# Patient Record
Sex: FEMALE | Race: WHITE | Employment: OTHER | URBAN - METROPOLITAN AREA
[De-identification: names, ages, dates, MRNs, and addresses within clinical notes are randomized per-mention and may not be internally consistent; named-entity substitution may affect disease eponyms.]

---

## 2019-12-17 ENCOUNTER — HOSPITAL ENCOUNTER (OUTPATIENT)
Dept: CT IMAGING | Age: 74
Discharge: HOME | End: 2019-12-17
Attending: EMERGENCY MEDICINE
Payer: MEDICARE

## 2019-12-17 DIAGNOSIS — R10.32 LEFT LOWER QUADRANT PAIN: ICD-10-CM

## 2019-12-17 PROCEDURE — 74176 CT ABD & PELVIS W/O CONTRAST: CPT

## 2021-09-05 ENCOUNTER — HOSPITAL ENCOUNTER (EMERGENCY)
Age: 76
Discharge: HOME | End: 2021-09-05
Attending: EMERGENCY MEDICINE
Payer: MEDICARE

## 2021-09-05 ENCOUNTER — APPOINTMENT (OUTPATIENT)
Dept: CT IMAGING | Age: 76
End: 2021-09-05
Attending: PHYSICIAN ASSISTANT
Payer: MEDICARE

## 2021-09-05 ENCOUNTER — APPOINTMENT (OUTPATIENT)
Dept: GENERAL RADIOLOGY | Age: 76
End: 2021-09-05
Attending: EMERGENCY MEDICINE
Payer: MEDICARE

## 2021-09-05 VITALS
DIASTOLIC BLOOD PRESSURE: 85 MMHG | WEIGHT: 85 LBS | HEART RATE: 102 BPM | SYSTOLIC BLOOD PRESSURE: 134 MMHG | TEMPERATURE: 98.7 F | OXYGEN SATURATION: 97 % | BODY MASS INDEX: 17.84 KG/M2 | RESPIRATION RATE: 13 BRPM | HEIGHT: 58 IN

## 2021-09-05 DIAGNOSIS — M25.531 RIGHT WRIST PAIN: ICD-10-CM

## 2021-09-05 DIAGNOSIS — S50.311A ABRASION OF RIGHT ELBOW, INITIAL ENCOUNTER: Primary | ICD-10-CM

## 2021-09-05 DIAGNOSIS — S00.03XA CONTUSION OF SCALP, INITIAL ENCOUNTER: ICD-10-CM

## 2021-09-05 DIAGNOSIS — S09.90XA MINOR HEAD INJURY, INITIAL ENCOUNTER: ICD-10-CM
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
## 2021-09-05 PROCEDURE — 99282 EMERGENCY DEPT VISIT SF MDM: CPT

## 2021-09-05 PROCEDURE — 72125 CT NECK SPINE W/O DYE: CPT

## 2021-09-05 PROCEDURE — 70450 CT HEAD/BRAIN W/O DYE: CPT

## 2021-09-05 PROCEDURE — 73080 X-RAY EXAM OF ELBOW: CPT

## 2021-09-05 PROCEDURE — 73110 X-RAY EXAM OF WRIST: CPT

## 2021-09-05 RX ORDER — ACETAMINOPHEN 325 MG/1
650 TABLET ORAL
Qty: 20 TABLET | Refills: 0 | Status: SHIPPED | OUTPATIENT
Start: 2021-09-05

## 2021-09-05 NOTE — ED PROVIDER NOTES
EMERGENCY DEPARTMENT HISTORY AND PHYSICAL EXAM      Date: 9/5/2021  Patient Name: Kurt Andrews    History of Presenting Illness     Chief Complaint   Patient presents with    Fall     Into triage via wheelchair with c/o mechanical fall today at [employer] - hit the back of her head but denies LOC. Also c/o Rt elbow pain and Rt hand/wrist pain.  Elbow Pain       History Provided By: Patient and Patient's     HPI: Kurt Andrews, 64 y.o. female with a history of neuropathy, COPD, hypertension and others presents with her  to the ED with cc of an hour or so of 10 out of 10 constant, achy right elbow and right wrist pain that is worse with movement. She tells me she was at [employer] today when she was going to the restroom. She tells me she reached out for the handle but her fingers must of slipped and she fell backwards. There was no loss of consciousness. She was able to pick herself up with some help. She was able to walk away unassisted and without the use of a walker. She and her  tell me they are visiting from [place] and they have been here a couple of weeks and expect to go back home in about a week. Her  goes on to tell me that she did have a mechanical fall back on August 23 however she seems to be recovering well from that. There are no other complaints, changes, or physical findings at this time. PCP: Marija Javed MD    Current Outpatient Medications   Medication Sig Dispense Refill    acetaminophen (TYLENOL) 325 mg tablet Take 2 Tablets by mouth every four (4) hours as needed for Pain. 20 Tablet 0    amLODIPine (NORVASC) 5 mg tablet Take 5 mg by mouth daily.  gabapentin (NEURONTIN) 100 mg capsule Take  by mouth three (3) times daily.  atenolol (TENORMIN) 25 mg tablet Take 25 mg by mouth daily.  budesonide-formoterol (SYMBICORT) 80-4.5 mcg/actuation HFAA inhaler Take 2 Puffs by inhalation two (2) times a day.       albuterol (PROVENTIL HFA) 90 mcg/actuation inhaler Take 2 Puffs by inhalation every six (6) hours as needed for Wheezing. Past History     Past Medical History:  No past medical history on file. Past Surgical History:  No past surgical history on file. Family History:  No family history on file. Social History:  Social History     Tobacco Use    Smoking status: Not on file   Substance Use Topics    Alcohol use: Not on file    Drug use: Not on file       Allergies: Allergies   Allergen Reactions    Bactrim [Sulfamethoprim Ds] Hives    Compazine [Prochlorperazine Edisylate] Angioedema    Demerol [Meperidine] Anxiety    Erythromycin Rash    Fentanyl Palpitations    Levaquin [Levofloxacin] Other (comments)    Percocet [Oxycodone-Acetaminophen] Diarrhea    Sulfa (Sulfonamide Antibiotics) Other (comments)     Review of Systems   Review of Systems   Constitutional: Negative for fatigue and fever. HENT: Negative for ear pain and sore throat. Eyes: Negative for pain, redness and visual disturbance. Respiratory: Negative for cough and shortness of breath. Cardiovascular: Negative for chest pain and palpitations. Gastrointestinal: Negative for abdominal pain, nausea and vomiting. Genitourinary: Negative for dysuria, frequency and urgency. Musculoskeletal: Negative for back pain, gait problem, neck pain and neck stiffness. Right wrist and elbow pain   Skin: Negative for rash and wound. Neurological: Positive for headaches (Right-sided scalp tenderness). Negative for dizziness, weakness, light-headedness and numbness. Physical Exam   Physical Exam  Vitals and nursing note reviewed. Constitutional:       General: She is not in acute distress. Appearance: She is well-developed. She is not toxic-appearing. Comments:   Thin; frail-appearing; pleasant; no distress   HENT:      Head: Normocephalic and atraumatic. Jaw: No trismus. Comments:    There is a small, tender contusion of the right side of the scalp. There is no break in the skin. There is no bony crepitus. Right Ear: External ear normal.      Left Ear: External ear normal.      Ears:      Comments:   Canals are unobstructed and there is no effusion or hemotympanum     Nose: Nose normal.      Mouth/Throat:      Pharynx: Uvula midline. Eyes:      General: No scleral icterus. Conjunctiva/sclera: Conjunctivae normal.      Pupils: Pupils are equal, round, and reactive to light. Neck:      Comments:   No bony midline tenderness  Full active range of motion of all extremities  Cardiovascular:      Rate and Rhythm: Normal rate and regular rhythm. Pulmonary:      Effort: Pulmonary effort is normal. No tachypnea, accessory muscle usage or respiratory distress. Breath sounds: No decreased breath sounds or wheezing. Abdominal:      Palpations: Abdomen is soft. Tenderness: There is no abdominal tenderness. Musculoskeletal:         General: Normal range of motion. Right elbow: Tenderness present. Cervical back: Full passive range of motion without pain and normal range of motion. Comments:   RIGHT ELBOW:  No bruising, redness or swelling  There is a small abrasion of the posterior elbow  Full active range of motion in all planes  Tenderness about the abrasion    RIGHT WRIST:  There is a small contusion of the volar aspect of the wrist approaching the thenar eminence  No swelling or redness  No deformity  Full active range of motion in all planes  Volar tenderness   Skin:     Findings: No rash. Neurological:      Mental Status: She is alert and oriented to person, place, and time. She is not disoriented. GCS: GCS eye subscore is 4. GCS verbal subscore is 5. GCS motor subscore is 6. Cranial Nerves: No cranial nerve deficit.    Psychiatric:         Speech: Speech normal.       Diagnostic Study Results     Labs -   No results found for this or any previous visit (from the past 12 hour(s)). Radiologic Studies -   CT HEAD WO CONT   Final Result   No acute intracranial process seen      CT SPINE CERV WO CONT   Final Result      No fracture seen. Marked levoconvex curvature      XR ELBOW RT MIN 3 V   Final Result      Small right right elbow effusion   Insertional right triceps tendinitis versus triceps contusion      XR WRIST RT AP/LAT/OBL MIN 3V   Final Result   No acute fracture. Ulnar lunar triquetral hamate impaction syndrome        CT Results  (Last 48 hours)               09/05/21 1212  CT HEAD WO CONT Final result    Impression:  No acute intracranial process seen       Narrative:  EXAM: CT HEAD WO CONT       INDICATION: fall; head injury       COMPARISON: None. CONTRAST: None. TECHNIQUE: Unenhanced CT of the head was performed using 5 mm images. Brain and   bone windows were generated. Coronal and sagittal reformats. CT dose reduction   was achieved through use of a standardized protocol tailored for this   examination and automatic exposure control for dose modulation. FINDINGS:   The ventricles and sulci are normal in size, shape and configuration. There is   mild periventricular white matter hypodensity. . There is no intracranial   hemorrhage, extra-axial collection, or mass effect. The basilar cisterns are   open. No CT evidence of acute infarct. There is calcification and ectasia of the   aortic iliac system. The bone windows demonstrate no abnormalities. The visualized portions of the   paranasal sinuses and mastoid air cells are clear. 09/05/21 1212  CT SPINE CERV WO CONT Final result    Impression:      No fracture seen. Marked levoconvex curvature       Narrative:  EXAM:  CT CERVICAL SPINE WITHOUT CONTRAST       INDICATION: fall; pain. COMPARISON: None. CONTRAST:  None. TECHNIQUE: Multislice helical CT of the cervical spine was performed without   intravenous contrast administration.   Sagittal and coronal reformats were   generated. CT dose reduction was achieved through use of a standardized   protocol tailored for this examination and automatic exposure control for dose   modulation. FINDINGS:       The alignment is remarkable for a levoconvex curvature. There is a 2 mm   retrolisthesis of C3 relative to C4. There is no fracture or compression   deformity. The odontoid process is intact. The craniocervical junction is within   normal limits. The incidentally imaged soft tissues are within normal limits. C2-C3: There is no spinal canal or neural foraminal stenosis. C3-C4: There is a disc space narrowing. The central canal measures 12 mm   anterior to posterior. Mild right-sided neural foraminal stenosis is present   (series 305, image 37). C4-C5: There is a disc osteophyte complex. Mild right-sided neural foraminal   stenosis is present (series 305, image 42). Tre Castellanos C5-C6: There is a disc osteophyte complex. Mild to moderate right-sided neural   foraminal stenosis is present (axial image 45). Tre Castellanos C6-C7: There is no spinal canal or neural foraminal stenosis. C7-T1: There is no spinal canal or neural foraminal stenosis. CXR Results  (Last 48 hours)    None        Medical Decision Making   I am the first provider for this patient. I reviewed the vital signs, available nursing notes, past medical history, past surgical history, family history and social history. Vital Signs-Reviewed the patient's vital signs. Patient Vitals for the past 12 hrs:   Temp Pulse Resp BP SpO2   09/05/21 1106 98.7 °F (37.1 °C) (!) 102 13 134/85 97 %       Pulse Oximetry Analysis - 97% on RA    Records Reviewed: Nursing Notes, Old Medical Records, Previous Radiology Studies and Previous Laboratory Studies    Provider Notes (Medical Decision Making):   DDx: Fracture, contusion, ICH, cervical fracture, stumble and fall    Afebrile and well-appearing.   Plain films of the elbow and wrist are negative for fracture. On exam, she has full active range of motion all planes of her elbow and wrist.  There is a small abrasion of the posterior right elbow that I cleaned and apply a bandage. CT of the head and neck are negative for acute process. Additional testing deferred. Will send some Tylenol to the pharmacy for pain. Return precautions for any concerns. ED Course:   Initial assessment performed. The patients presenting problems have been discussed, and they are in agreement with the care plan formulated and outlined with them. I have encouraged them to ask questions as they arise throughout their visit. Disposition:  Discharge    PLAN:  1. Discharge Medication List as of 9/5/2021 12:45 PM      START taking these medications    Details   acetaminophen (TYLENOL) 325 mg tablet Take 2 Tablets by mouth every four (4) hours as needed for Pain., Normal, Disp-20 Tablet, R-0         CONTINUE these medications which have NOT CHANGED    Details   amLODIPine (NORVASC) 5 mg tablet Take 5 mg by mouth daily. , Historical Med      gabapentin (NEURONTIN) 100 mg capsule Take  by mouth three (3) times daily. , Historical Med      atenolol (TENORMIN) 25 mg tablet Take 25 mg by mouth daily. , Historical Med      budesonide-formoterol (SYMBICORT) 80-4.5 mcg/actuation HFAA inhaler Take 2 Puffs by inhalation two (2) times a day., Historical Med      albuterol (PROVENTIL HFA) 90 mcg/actuation inhaler Take 2 Puffs by inhalation every six (6) hours as needed for Wheezing., Historical Med           2. Follow-up Information     Follow up With Specialties Details Why Contact Info    Arpan Hidalgo MD Internal Medicine Call  PRIMARY CARE: as needed [address] [facility]  [phone]      [facility] EMERGENCY DEPT Emergency Medicine  If symptoms worsen [address]  [address] [facility]  [phone]        Return to ED if worse     Diagnosis     Clinical Impression:   1.  Abrasion of right elbow, initial encounter    2. Right wrist pain    3. Contusion of scalp, initial encounter    4. Minor head injury, initial encounter    5.  Fall on same level from slipping, tripping or stumbling, initial encounter

## 2021-09-05 NOTE — ED NOTES
Sonia FLETCHER reviewed discharge instructions with the patient. The patient verbalized understanding. All questions and concerns were addressed. The patient is discharged via wheelchair in the care of family members with instructions and prescriptions in hand. Pt is alert and oriented x 4. Respirations are clear and unlabored.